# Patient Record
Sex: FEMALE | Race: WHITE | NOT HISPANIC OR LATINO | Employment: OTHER | URBAN - METROPOLITAN AREA
[De-identification: names, ages, dates, MRNs, and addresses within clinical notes are randomized per-mention and may not be internally consistent; named-entity substitution may affect disease eponyms.]

---

## 2022-08-09 ENCOUNTER — OFFICE VISIT (OUTPATIENT)
Dept: GASTROENTEROLOGY | Facility: CLINIC | Age: 87
End: 2022-08-09

## 2022-08-09 VITALS
HEIGHT: 64 IN | HEART RATE: 75 BPM | OXYGEN SATURATION: 97 % | DIASTOLIC BLOOD PRESSURE: 64 MMHG | WEIGHT: 172.6 LBS | SYSTOLIC BLOOD PRESSURE: 132 MMHG | TEMPERATURE: 97.9 F | BODY MASS INDEX: 29.47 KG/M2

## 2022-08-09 DIAGNOSIS — D12.6 TUBULOVILLOUS ADENOMA OF COLON: Primary | ICD-10-CM

## 2022-08-09 PROCEDURE — 99203 OFFICE O/P NEW LOW 30 MIN: CPT | Performed by: PHYSICIAN ASSISTANT

## 2022-08-09 RX ORDER — LISINOPRIL 20 MG/1
TABLET ORAL
COMMUNITY

## 2022-08-09 RX ORDER — FUROSEMIDE 20 MG/1
TABLET ORAL
COMMUNITY

## 2022-08-09 RX ORDER — ESOMEPRAZOLE MAGNESIUM 40 MG/1
CAPSULE, DELAYED RELEASE ORAL
COMMUNITY

## 2022-08-09 RX ORDER — OMEPRAZOLE 20 MG/1
20 CAPSULE, DELAYED RELEASE ORAL DAILY
COMMUNITY

## 2022-08-09 RX ORDER — ATORVASTATIN CALCIUM 10 MG/1
TABLET, FILM COATED ORAL
COMMUNITY

## 2022-08-09 RX ORDER — AMIODARONE HYDROCHLORIDE 200 MG/1
TABLET ORAL
COMMUNITY

## 2022-08-09 RX ORDER — MONTELUKAST SODIUM 10 MG/1
TABLET ORAL
COMMUNITY

## 2022-08-09 RX ORDER — POTASSIUM CHLORIDE 1.5 G/1.77G
POWDER, FOR SOLUTION ORAL
COMMUNITY

## 2022-08-09 NOTE — PROGRESS NOTES
New Patient Consultation     Patient Name: Yolanda Deleon  : 1934   MRN: [id number]     Chief Complaint:  No chief complaint on file.      History of Present Illness: Yolanda Deleon is a 87 y.o. female, PMH includes HTN, T2DM, HL, GERD, who is here today for a Gastroenterology Consultation for evaluation of sigmoid mass.     CSY 2022 with Dr. Christian. Inadequate bowel prep conditions. Small polyps in transverse colon, at level of hepatic flexure, all removed with snare. Mass noted in sigmoid colon, not amenable to resection but snare was used to obtain biopsies. Bx consistent with tubulovillous adenoma, negative for high grade dysplasia.     Pt was referred for second opinion and possible CSY with EMR. Pt was initially experiencing rectal bleeding, but is now back to her baseline of formed, medium brown stool output every 2-3 days.     Patient denies associated fever, chills, abdominal pain, indigestion, nausea, vomiting, diarrhea, constipation, hematemesis, dysphagia, hematochezia, melena, bloating, weight loss or gain, dysuria, jaundice or bruising.    Patient denies personal or FHx of PUD, H Pylori, gastritis, pancreatitis, colitis, Celiac disease, UC, Crohn's disease, IBS, colon or gastric cancers. Pt denies EtOH, tobacco, illicit substance or NSAID use.    Subjective      Review of Systems:   Review of Systems   Constitutional: Negative for appetite change, chills, diaphoresis, fatigue, fever, unexpected weight gain and unexpected weight loss.   HENT: Negative for drooling, facial swelling, mouth sores, rhinorrhea, sore throat, tinnitus, trouble swallowing and voice change.    Eyes: Negative.    Respiratory: Negative for cough, chest tightness and shortness of breath.    Cardiovascular: Negative for chest pain.   Gastrointestinal: Positive for anal bleeding (resolved). Negative for abdominal pain, blood in stool, constipation, diarrhea, nausea, vomiting, GERD and indigestion.   Genitourinary:  Negative for dysuria, flank pain, hematuria and pelvic pain.   Musculoskeletal: Negative for arthralgias and myalgias.   Skin: Negative for color change, pallor and rash.   Neurological: Negative for dizziness, tremors, syncope, weakness and numbness.   Psychiatric/Behavioral: Negative for hallucinations and sleep disturbance. The patient is not nervous/anxious.    All other systems reviewed and are negative.      Past Medical History: No past medical history on file.    Past Surgical History: No past surgical history on file.    Family History: No family history on file.    Social History:   Social History     Socioeconomic History   • Marital status:        Alcohol/Tobacco History:   Social History     Substance and Sexual Activity   Alcohol Use Not on file     Social History     Tobacco Use   Smoking Status Not on file   Smokeless Tobacco Not on file       Medications:     Current Outpatient Medications:   •  amiodarone (PACERONE) 200 MG tablet, amiodarone 200 mg tablet  Take 2 tablets every day by oral route., Disp: , Rfl:   •  atorvastatin (Lipitor) 10 MG tablet, Lipitor 10 mg tablet  Take 1 tablet every day by oral route., Disp: , Rfl:   •  Biotin 2.5 MG capsule, biotin 2,500 mcg capsule  Take 2 capsules every day by oral route for 90 days., Disp: , Rfl:   •  esomeprazole (nexIUM) 40 MG capsule, Nexium 40 mg capsule,delayed release  Daily, Disp: , Rfl:   •  furosemide (LASIX) 20 MG tablet, furosemide 20 mg tablet  Two times a day, Disp: , Rfl:   •  Glimepiride (AMARYL PO), glimepiride, Disp: , Rfl:   •  linagliptin (Tradjenta) 5 MG tablet tablet, Tradjenta 5 mg tablet  Take 1 tablet every day by oral route., Disp: , Rfl:   •  lisinopril (PRINIVIL,ZESTRIL) 20 MG tablet, lisinopril 20 mg tablet  Bedtime, Disp: , Rfl:   •  metFORMIN (Glucophage) 500 MG tablet, Glucophage 500 mg tablet  Two times a day, Disp: , Rfl:   •  montelukast (Singulair) 10 MG tablet, Singulair 10 mg tablet  Take 1 tablet every day  by oral route., Disp: , Rfl:   •  potassium chloride (Klor-Con) 20 MEQ packet, Klor-Con 20 mEq oral packet  Daily, Disp: , Rfl:   •  rivaroxaban (Xarelto) 20 MG tablet, Xarelto 20 mg tablet  Take 1 tablet every day by oral route., Disp: , Rfl:     Allergies:   Allergies   Allergen Reactions   • Aspirin Swelling       Objective     Physical Exam:  Vital Signs: There were no vitals filed for this visit.  There is no height or weight on file to calculate BMI.     Physical Exam  Vitals and nursing note reviewed.   Constitutional:       Appearance: Normal appearance. She is obese. She is not ill-appearing or diaphoretic.   HENT:      Head: Normocephalic and atraumatic.      Right Ear: External ear normal.      Left Ear: External ear normal.      Nose: Nose normal. No rhinorrhea.      Mouth/Throat:      Mouth: Mucous membranes are moist.      Pharynx: Oropharynx is clear. No posterior oropharyngeal erythema.   Eyes:      General:         Right eye: No discharge.         Left eye: No discharge.      Conjunctiva/sclera: Conjunctivae normal.      Pupils: Pupils are equal, round, and reactive to light.   Neck:      Thyroid: No thyromegaly.   Cardiovascular:      Rate and Rhythm: Normal rate and regular rhythm.      Pulses: Normal pulses.      Heart sounds: Normal heart sounds. No murmur heard.  Pulmonary:      Effort: Pulmonary effort is normal.      Breath sounds: Normal breath sounds. No wheezing.   Abdominal:      General: Abdomen is flat. Bowel sounds are normal. There is no distension.      Tenderness: There is no abdominal tenderness.   Musculoskeletal:         General: No tenderness. Normal range of motion.      Cervical back: Normal range of motion and neck supple.      Right lower leg: No edema.      Left lower leg: No edema.   Skin:     General: Skin is warm and dry.      Capillary Refill: Capillary refill takes less than 2 seconds.      Coloration: Skin is not jaundiced.      Findings: No bruising.   Neurological:       General: No focal deficit present.      Mental Status: She is oriented to person, place, and time.      Cranial Nerves: No cranial nerve deficit.   Psychiatric:         Mood and Affect: Mood normal.         Thought Content: Thought content normal.         Judgment: Judgment normal.         Assessment / Plan      Assessment/Plan:   There are no diagnoses linked to this encounter.     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 Saul, who states that pt would best benefit from colorectal evaluation for surgical resection of the mass rather than repeat CSY with EMR   - I spoke with  at [facility] who was able to make patient an appt with Dr. Contreras at their office at 1:30pm today   - pt was afforded directions to [facility], paper copy of her records prior to leaving our office today    Follow Up:   Return if symptoms worsen or fail to improve.    Plan of care reviewed with the patient at the conclusion of today's visit.  Education was provided regarding diagnosis, management, and any prescribed or recommended OTC medications.  Patient verbalized understanding of and agreement with management plan.     Time Statement:   Discussed plan of care in detail with patient today. Patient verbally understands and agrees. I have spent 30 minutes reviewing available diagnostics, obtaining history, examining the patient, developing a treatment plan, and educating the patient on disease process and plan of care.    Lashay Mora PA-C   [facility]

## 2025-02-12 ENCOUNTER — TELEPHONE (OUTPATIENT)
Age: OVER 89
End: 2025-02-12
Payer: MEDICARE

## 2025-02-12 NOTE — TELEPHONE ENCOUNTER
PATIENTS DAUGHTER CALLED IN TO CHECK ON THE STATUS OF A REFERRAL. THIS REFERRAL WAS MADE AT A JANUARY APPOINTMENT WITH OUR OLD PRACTICE. SINCE IT IS A PAPER CHART, WE DO NOT HAVE ACCESS TO THAT AND CANNOT CHECK WHAT THE REFERRAL IS FOR OR WHO IT IS TO. I ADVISED I WOULD ASK OUR PRACTICE MANAGER TO GET THE CHART AND PROVIDE IT TO OUR CLINICAL TEAM SO THE REFERRAL CAN BE ENTERED IN EPIC AND THEN SENT.

## 2025-03-04 ENCOUNTER — TELEPHONE (OUTPATIENT)
Age: OVER 89
End: 2025-03-04

## 2025-03-04 NOTE — TELEPHONE ENCOUNTER
Checked to see if chart was uploaded. It is still not uploaded, will give the name to Ayaka again to see they can rush to get it into the system.

## 2025-03-06 NOTE — TELEPHONE ENCOUNTER
Chart still not uploaded into the info resources website. Will try to look again later this evening.

## 2025-03-13 PROBLEM — I48.0 PAROXYSMAL ATRIAL FIBRILLATION: Status: ACTIVE | Noted: 2025-03-13

## 2025-03-13 PROBLEM — E78.5 HYPERLIPIDEMIA LDL GOAL <55: Status: ACTIVE | Noted: 2025-03-13

## 2025-03-13 PROBLEM — I25.10 CORONARY ARTERY DISEASE INVOLVING NATIVE CORONARY ARTERY OF NATIVE HEART: Status: ACTIVE | Noted: 2025-03-13

## 2025-03-13 PROBLEM — I10 HYPERTENSION, ESSENTIAL: Status: ACTIVE | Noted: 2025-03-13

## 2025-03-13 PROBLEM — N18.31 STAGE 3A CHRONIC KIDNEY DISEASE: Status: ACTIVE | Noted: 2025-03-13

## 2025-03-13 PROBLEM — I87.2 CHRONIC VENOUS INSUFFICIENCY OF LOWER EXTREMITY: Status: ACTIVE | Noted: 2025-03-13

## 2025-03-13 NOTE — PROGRESS NOTES
Cardiology Established Patient Note     Name: Yolanda Deleon  :   1934  PCP: Kanu Galeano PA-C  Date:   2025  Department: [facility] CARDIOLOGY  [address]  [facility] [address]  Fax [phone]  Phone [phone]    Chief Complaint:Here for my heart.    Subjective     History of Present Illness  Yolanda Deleon is a 89 y.o. female who presents today for 2-month follow-up.  Past cardiac history CAD, paroxysmal A-fib, hypertension, hyperlipidemia,  & chronic venous insufficiency.  She recently had worsening anasarca and had a venous duplex study in [place].  We do not have the report available to us however she was told that she has a clot in her left lower extremity where she had venous ablation.  The family was not told that she has a DVT in the right lower extremity where she did have 1 in .  And started on blood thinners.  She then had significant GI bleed and blood thinners were stopped.  She now feels better.  However she is still complaining of bilateral lower extremity edema worse on the left.    The following data was reviewed by: Damien Vieira MD on 2025:    Cath 2015: EF 55%, stent LAD  Renal ultrasound 2024: No stenosis  Carotid 2024: Less than 50% stenosis  Venous 10/7/2022: Acute on chronic thrombus noted in the right distal right femoral vein consistent with DVT., chronic SVT noted in left SSV, deep system reflux bilaterally  Echo 2022: EF 60 to 65%, mild LVH, trace AI, trace MR, mild TR  MCT 24: No arrhythmia, 7.48% SVE burden      Current Outpatient Medications:   •  amiodarone (PACERONE) 200 MG tablet, amiodarone 200 mg tablet  Take 2 tablets every day by oral route. (Patient taking differently: Take 0.5 tablets by mouth Daily.), Disp: , Rfl:   •  atorvastatin (LIPITOR) 10 MG tablet, Lipitor 10 mg tablet  Take 1 tablet every day by oral route. (Patient taking differently: Take 1  "tablet by mouth Every Night.), Disp: , Rfl:   •  Biotin 2.5 MG capsule, biotin 2,500 mcg capsule  Take 2 capsules every day by oral route for 90 days., Disp: , Rfl:   •  furosemide (LASIX) 20 MG tablet, furosemide 20 mg tablet  Two times a day, Disp: , Rfl:   •  montelukast (SINGULAIR) 10 MG tablet, Singulair 10 mg tablet  Take 1 tablet every day by oral route., Disp: , Rfl:   •  omeprazole (priLOSEC) 20 MG capsule, Take 1 capsule by mouth Daily., Disp: , Rfl:        Objective     Vital Signs:  /63 (BP Location: Left arm, Patient Position: Sitting, Cuff Size: Adult)   Pulse 96   Ht 160 cm (63\")   Wt 76.7 kg (169 lb)   BMI 29.94 kg/m²   Estimated body mass index is 29.94 kg/m² as calculated from the following:    Height as of this encounter: 160 cm (63\").    Weight as of this encounter: 76.7 kg (169 lb).         Cardiovascular:      PMI at left midclavicular line. Normal rate. Regular rhythm. Normal S1. Normal S2.       Murmurs: There is a grade 2/6 systolic murmur.      No gallop.  No click. No rub.   Pulses:     Intact distal pulses.   Edema:     Peripheral edema present.     Ankle: 2+ edema of the left ankle and 3+ edema of the right ankle.     Feet: 2+ edema of the left foot and 3+ edema of the right foot.      ECG 12 Lead    Date/Time: 3/14/2025 11:13 AM  Performed by: Damien Vieira MD    Authorized by: Damien Vieira MD  Rhythm: sinus rhythm  Ectopy: atrial premature contractions  Rate: normal  Other findings: non-specific ST-T wave changes  Comments: Normal sinus rhythm rhythm with PACs.         Assessment and Plan     Assessment & Plan  Coronary artery disease involving native coronary artery of native heart, unspecified whether angina present  Worsening SOB         Paroxysmal atrial fibrillation  Stable  EKG  Continue Xarelto and Amiodarone.       Hyperlipidemia LDL goal <55   Needs lopid panel         Hypertension, essential  BP log         Stage 3a chronic kidney disease  Needs labs     "     Chronic venous insufficiency of lower extremity  Worsening left lower extremity edema worse on the left compared to the right.  Reportedly recent DVT left lower extremity report from [place] not available.  we will reimage both lower extremities today.       SOB (shortness of breath)           Follow Up  No follow-ups on file.    Damien Vieira MD    [facility]

## 2025-03-14 ENCOUNTER — HOSPITAL ENCOUNTER (OUTPATIENT)
Facility: HOSPITAL | Age: OVER 89
Discharge: HOME | End: 2025-03-14
Payer: MEDICARE

## 2025-03-14 ENCOUNTER — LAB (OUTPATIENT)
Facility: HOSPITAL | Age: OVER 89
End: 2025-03-14
Payer: MEDICARE

## 2025-03-14 ENCOUNTER — OFFICE VISIT (OUTPATIENT)
Age: OVER 89
End: 2025-03-14
Payer: MEDICARE

## 2025-03-14 VITALS
WEIGHT: 169 LBS | DIASTOLIC BLOOD PRESSURE: 63 MMHG | BODY MASS INDEX: 29.95 KG/M2 | SYSTOLIC BLOOD PRESSURE: 120 MMHG | HEART RATE: 96 BPM | HEIGHT: 63 IN

## 2025-03-14 DIAGNOSIS — I87.2 CHRONIC VENOUS INSUFFICIENCY OF LOWER EXTREMITY: ICD-10-CM

## 2025-03-14 DIAGNOSIS — I13.0 HYPERTENSIVE HEART AND CHRONIC KIDNEY DISEASE WITH HEART FAILURE AND STAGE 1 THROUGH STAGE 4 CHRONIC KIDNEY DISEASE, OR UNSPECIFIED CHRONIC KIDNEY DISEASE: ICD-10-CM

## 2025-03-14 DIAGNOSIS — E78.5 HYPERLIPIDEMIA LDL GOAL <55: ICD-10-CM

## 2025-03-14 DIAGNOSIS — I25.10 CORONARY ARTERY DISEASE INVOLVING NATIVE CORONARY ARTERY OF NATIVE HEART, UNSPECIFIED WHETHER ANGINA PRESENT: Primary | ICD-10-CM

## 2025-03-14 DIAGNOSIS — N18.31 STAGE 3A CHRONIC KIDNEY DISEASE: ICD-10-CM

## 2025-03-14 DIAGNOSIS — I48.0 PAROXYSMAL ATRIAL FIBRILLATION: ICD-10-CM

## 2025-03-14 DIAGNOSIS — I10 HYPERTENSION, ESSENTIAL: ICD-10-CM

## 2025-03-14 DIAGNOSIS — R06.02 SOB (SHORTNESS OF BREATH): ICD-10-CM

## 2025-03-14 LAB
ANION GAP SERPL CALCULATED.3IONS-SCNC: 11.8 MMOL/L (ref 5–15)
BH CV LOW VAS LEFT GREATER SAPH AK VESSEL: 1
BH CV LOW VAS LEFT GREATER SAPH BK VESSEL: 1
BH CV LOW VAS LEFT LESSER SAPH VESSEL: 1
BH CV LOW VAS LEFT POPLITEAL SPONT: 1
BH CV LOW VAS RIGHT GREATER SAPH AK VESSEL: 1
BH CV LOW VAS RIGHT GREATER SAPH BK VESSEL: 1
BH CV LOW VAS RIGHT LESSER SAPH VESSEL: 1
BH CV LOW VAS RIGHT POPLITEAL SPONT: 1
BH CV LOW VAS RIGHT PROXIMAL FEMORAL SPONT: 1
BH CV LOWER VASCULAR LEFT COMMON FEMORAL AUGMENT: NORMAL
BH CV LOWER VASCULAR LEFT COMMON FEMORAL COMPETENT: NORMAL
BH CV LOWER VASCULAR LEFT COMMON FEMORAL COMPRESS: NORMAL
BH CV LOWER VASCULAR LEFT COMMON FEMORAL PHASIC: NORMAL
BH CV LOWER VASCULAR LEFT COMMON FEMORAL SPONT: NORMAL
BH CV LOWER VASCULAR LEFT DISTAL FEMORAL COMPRESS: NORMAL
BH CV LOWER VASCULAR LEFT GASTRONEMIUS COMPRESS: NORMAL
BH CV LOWER VASCULAR LEFT GREATER SAPH AK COMPRESS: NORMAL
BH CV LOWER VASCULAR LEFT GREATER SAPH BK COMPRESS: NORMAL
BH CV LOWER VASCULAR LEFT LESSER SAPH COMPRESS: NORMAL
BH CV LOWER VASCULAR LEFT MID FEMORAL AUGMENT: NORMAL
BH CV LOWER VASCULAR LEFT MID FEMORAL COMPETENT: NORMAL
BH CV LOWER VASCULAR LEFT MID FEMORAL COMPRESS: NORMAL
BH CV LOWER VASCULAR LEFT MID FEMORAL PHASIC: NORMAL
BH CV LOWER VASCULAR LEFT MID FEMORAL SPONT: NORMAL
BH CV LOWER VASCULAR LEFT PERONEAL COMPRESS: NORMAL
BH CV LOWER VASCULAR LEFT POPLITEAL AUGMENT: NORMAL
BH CV LOWER VASCULAR LEFT POPLITEAL COMPETENT: NORMAL
BH CV LOWER VASCULAR LEFT POPLITEAL COMPRESS: NORMAL
BH CV LOWER VASCULAR LEFT POPLITEAL PHASIC: NORMAL
BH CV LOWER VASCULAR LEFT POPLITEAL SPONT: NORMAL
BH CV LOWER VASCULAR LEFT POPLITEAL THROMBUS: NORMAL
BH CV LOWER VASCULAR LEFT POSTERIOR TIBIAL COMPRESS: NORMAL
BH CV LOWER VASCULAR LEFT PROFUNDA FEMORAL COMPRESS: NORMAL
BH CV LOWER VASCULAR LEFT PROXIMAL FEMORAL COMPRESS: NORMAL
BH CV LOWER VASCULAR LEFT SAPHENOFEMORAL JUNCTION COMPRESS: NORMAL
BH CV LOWER VASCULAR RIGHT COMMON FEMORAL AUGMENT: NORMAL
BH CV LOWER VASCULAR RIGHT COMMON FEMORAL COMPETENT: NORMAL
BH CV LOWER VASCULAR RIGHT COMMON FEMORAL COMPRESS: NORMAL
BH CV LOWER VASCULAR RIGHT COMMON FEMORAL PHASIC: NORMAL
BH CV LOWER VASCULAR RIGHT COMMON FEMORAL SPONT: NORMAL
BH CV LOWER VASCULAR RIGHT GREATER SAPH AK COMPRESS: NORMAL
BH CV LOWER VASCULAR RIGHT GREATER SAPH BK COMPRESS: NORMAL
BH CV LOWER VASCULAR RIGHT LESSER SAPH COMPRESS: NORMAL
BH CV LOWER VASCULAR RIGHT LESSER SAPH THROMBUS: NORMAL
BH CV LOWER VASCULAR RIGHT MID FEMORAL AUGMENT: NORMAL
BH CV LOWER VASCULAR RIGHT MID FEMORAL COMPETENT: NORMAL
BH CV LOWER VASCULAR RIGHT MID FEMORAL COMPRESS: NORMAL
BH CV LOWER VASCULAR RIGHT MID FEMORAL PHASIC: NORMAL
BH CV LOWER VASCULAR RIGHT MID FEMORAL SPONT: NORMAL
BH CV LOWER VASCULAR RIGHT PERONEAL COMPRESS: NORMAL
BH CV LOWER VASCULAR RIGHT POPLITEAL AUGMENT: NORMAL
BH CV LOWER VASCULAR RIGHT POPLITEAL COMPETENT: NORMAL
BH CV LOWER VASCULAR RIGHT POPLITEAL COMPRESS: NORMAL
BH CV LOWER VASCULAR RIGHT POPLITEAL PHASIC: NORMAL
BH CV LOWER VASCULAR RIGHT POPLITEAL SPONT: NORMAL
BH CV LOWER VASCULAR RIGHT POSTERIOR TIBIAL COMPRESS: NORMAL
BH CV LOWER VASCULAR RIGHT PROFUNDA FEMORAL COMPRESS: NORMAL
BH CV LOWER VASCULAR RIGHT PROXIMAL FEMORAL AUGMENT: NORMAL
BH CV LOWER VASCULAR RIGHT PROXIMAL FEMORAL COMPETENT: NORMAL
BH CV LOWER VASCULAR RIGHT PROXIMAL FEMORAL COMPRESS: NORMAL
BH CV LOWER VASCULAR RIGHT PROXIMAL FEMORAL PHASIC: NORMAL
BH CV LOWER VASCULAR RIGHT PROXIMAL FEMORAL SPONT: NORMAL
BH CV LOWER VASCULAR RIGHT SAPHENOFEMORAL JUNCTION COMPRESS: NORMAL
BUN SERPL-MCNC: 17 MG/DL (ref 8–23)
BUN/CREAT SERPL: 25.4 (ref 7–25)
CALCIUM SPEC-SCNC: 7.7 MG/DL (ref 8.2–9.6)
CHLORIDE SERPL-SCNC: 107 MMOL/L (ref 98–107)
CHOLEST SERPL-MCNC: 106 MG/DL (ref 0–200)
CO2 SERPL-SCNC: 23.2 MMOL/L (ref 22–29)
CREAT SERPL-MCNC: 0.67 MG/DL (ref 0.57–1)
EGFRCR SERPLBLD CKD-EPI 2021: 83.1 ML/MIN/1.73
GLUCOSE SERPL-MCNC: 171 MG/DL (ref 65–99)
HDLC SERPL-MCNC: 60 MG/DL (ref 40–60)
LDLC SERPL CALC-MCNC: 29 MG/DL (ref 0–100)
LDLC/HDLC SERPL: 0.49 {RATIO}
NT-PROBNP SERPL-MCNC: 729 PG/ML (ref 0–1800)
POTASSIUM SERPL-SCNC: 3 MMOL/L (ref 3.5–5.2)
SODIUM SERPL-SCNC: 142 MMOL/L (ref 136–145)
TRIGL SERPL-MCNC: 84 MG/DL (ref 0–150)
VLDLC SERPL-MCNC: 17 MG/DL (ref 5–40)

## 2025-03-14 PROCEDURE — 82570 ASSAY OF URINE CREATININE: CPT

## 2025-03-14 PROCEDURE — 36415 COLL VENOUS BLD VENIPUNCTURE: CPT

## 2025-03-14 PROCEDURE — 82043 UR ALBUMIN QUANTITATIVE: CPT

## 2025-03-14 PROCEDURE — 93970 EXTREMITY STUDY: CPT

## 2025-03-14 PROCEDURE — 86141 C-REACTIVE PROTEIN HS: CPT

## 2025-03-14 PROCEDURE — 80076 HEPATIC FUNCTION PANEL: CPT

## 2025-03-14 PROCEDURE — 83695 ASSAY OF LIPOPROTEIN(A): CPT

## 2025-03-14 PROCEDURE — 80048 BASIC METABOLIC PNL TOTAL CA: CPT

## 2025-03-14 PROCEDURE — 83880 ASSAY OF NATRIURETIC PEPTIDE: CPT

## 2025-03-14 PROCEDURE — 80061 LIPID PANEL: CPT

## 2025-03-14 NOTE — ASSESSMENT & PLAN NOTE
Worsening left lower extremity edema worse on the left compared to the right.  Reportedly recent DVT left lower extremity report from [place] not available.  we will reimage both lower extremities today.

## 2025-03-15 LAB
ALBUMIN SERPL-MCNC: 2.7 G/DL (ref 3.5–5.2)
ALBUMIN UR-MCNC: <1.2 MG/DL
ALP SERPL-CCNC: 79 U/L (ref 39–117)
ALT SERPL W P-5'-P-CCNC: 6 U/L (ref 1–33)
AST SERPL-CCNC: 14 U/L (ref 1–32)
BILIRUB CONJ SERPL-MCNC: <0.1 MG/DL (ref 0–0.3)
BILIRUB INDIRECT SERPL-MCNC: ABNORMAL MG/DL
BILIRUB SERPL-MCNC: <0.2 MG/DL (ref 0–1.2)
CREAT UR-MCNC: 136.1 MG/DL
CRP SERPL-MCNC: 1.69 MG/DL (ref 0.01–0.5)
MICROALBUMIN/CREAT UR: NORMAL MG/G{CREAT}
PROT SERPL-MCNC: 5.9 G/DL (ref 6–8.5)

## 2025-03-17 ENCOUNTER — TELEPHONE (OUTPATIENT)
Age: OVER 89
End: 2025-03-17
Payer: MEDICARE

## 2025-03-17 LAB — LPA SERPL-SCNC: 86 NMOL/L

## 2025-03-17 NOTE — TELEPHONE ENCOUNTER
Attempted call to patient to start on blood thinner, no answer, left voicemail to return call to office.

## 2025-03-18 ENCOUNTER — TELEPHONE (OUTPATIENT)
Age: OVER 89
End: 2025-03-18
Payer: MEDICARE

## 2025-03-18 NOTE — TELEPHONE ENCOUNTER
Zachary, patient's brother called Dr. Vieira late last night with concerns of new medications. Dr. Vieira requested I call him back to see what exactly patient was needing in regard to blood thinner. I attempted to call Zachary ([phone]) NA/NVM.

## 2025-03-19 ENCOUNTER — TELEPHONE (OUTPATIENT)
Age: OVER 89
End: 2025-03-19
Payer: MEDICARE

## 2025-03-19 DIAGNOSIS — I82.4Y2 ACUTE DEEP VEIN THROMBOSIS (DVT) OF PROXIMAL VEIN OF LEFT LOWER EXTREMITY: Primary | ICD-10-CM

## 2025-03-19 NOTE — TELEPHONE ENCOUNTER
Patient's daughter called this morning after Dr. Vieira called her brother, the patient's son, and reports that Dr. Vieira would like for her to start Eliquis 5 mg p.o. twice daily but that the patient did not have a prescription for Eliquis.  Patient was recently diagnosed with acute DVT, patient does have a history of GI bleeding on Xarelto.  Discussed with patient and family members the risk of bleeding,   Are agreeable to try Eliquis and monitor for signs of severe bleeding.  Patient has concern of cancer recurrence.  Patient also has atrial fibrillation, will need to discuss with patient option for Watchman/IVC filter if oral anticoagulation fails again.  Eliquis 5 mg p.o. twice daily was sent into the pharmacy.  Opted not to do treatment dose of Eliquis x 1 week secondary to history of GI bleeding.  Patient does not meet criteria for lower dose of Eliquis other than just her age.  Discussed with Dr. Vieira.

## 2025-03-19 NOTE — TELEPHONE ENCOUNTER
Noemí talked to family today in regards to this medication (eliquis) and it was sent into pharmacy of patient request.

## 2025-04-03 ENCOUNTER — TELEPHONE (OUTPATIENT)
Age: OVER 89
End: 2025-04-03
Payer: MEDICARE

## 2025-04-03 NOTE — TELEPHONE ENCOUNTER
The patient's daughter called and said Dr. Vieira wanted to watch her hemoglobin since starting her back on blood thinner. It is 10.4 down from 11.4. Please advise.    Unable to obtain from patient   HCP- Berto Mitchell  Has A

## 2025-04-03 NOTE — TELEPHONE ENCOUNTER
Spoke with patient's granddaughter. She states the patient doesn't have any signs of bleeding, to her knowledge. Advised them to call back if anything changes and we will order the recheck labs at next appt, on 04/11/2025, granddaughter understood.

## 2025-04-10 NOTE — PROGRESS NOTES
Cardiology Established Patient Note     Name: Yolanda Deleon  :   1934  PCP: Kanu Galeano PA-C  Date:   2025  Department: [facility] [place] [facility] CARDIOLOGY  [address]  [facility] [address]  Fax [phone]  Phone [phone]    Chief Complaint:   Here for my blood clot.  Subjective     History of Present Illness  Yolanda Deleon is a 89 y.o. female who presents today for 4-week follow-up.  Recently had pain and swelling in lower q3ajwheltarg. Venous duplex study cw L popliteal vein thrombosis.  Started on DOAC.H/H dropping from 11. 3 to 10 mg/dL25.   She is asymptomtic  CKD 3 without sig proteinuria.  Problem list:  CAD  Cath 2015: EF 55%, stent LAD   Paroxysmal A-fib  Echo 2022: EF 60 to 65%, mild LVH, trace AI, trace MR, mild TR   MCT 24: No arrhythmia, 7.48% SVE burden   Hypertension  Renal ultrasound 2024: No stenosis   Hyperlipidemia  Carotid 2024: Less than 50% stenosis   Chronic venous insufficiency  Venous 10/7/2022: Acute on chronic thrombus noted in the right distal right femoral vein consistent with DVT., chronic SVT noted in left SSV, deep system reflux bilaterally   Venous w Reflux Lower Extremity - Bilateral CAR (2025 11:15)   DVT  Duplex Venous Lower Extremity - Bilateral CAR (2025 15:17)   L Pop vein thrombosis  History GI bleed    Current Outpatient Medications   Medication Instructions    amiodarone (PACERONE) 200 MG tablet amiodarone 200 mg tablet   Take 2 tablets every day by oral route.    apixaban (ELIQUIS) 5 mg, Oral, 2 Times Daily    atorvastatin (LIPITOR) 10 MG tablet Lipitor 10 mg tablet   Take 1 tablet every day by oral route.    Biotin 2.5 MG capsule biotin 2,500 mcg capsule   Take 2 capsules every day by oral route for 90 days.    furosemide (LASIX) 20 MG tablet furosemide 20 mg tablet   Two times a day    montelukast (SINGULAIR) 10 MG tablet Singulair 10  "mg tablet   Take 1 tablet every day by oral route.    omeprazole (PRILOSEC) 20 mg, Daily        Lab Results   Component Value Date    GLUCOSE 171 (H) 03/14/2025    BUN 17 03/14/2025    CREATININE 0.67 03/14/2025    EGFRIFAFRI 48 02/17/2023    BCR 25.4 (H) 03/14/2025    K 3.0 (L) 03/14/2025    CO2 23.2 03/14/2025    CALCIUM 7.7 (L) 03/14/2025    ALBUMIN 2.7 (L) 03/14/2025    AST 14 03/14/2025    ALT 6 03/14/2025     Lab Results   Component Value Date    CHOL 106 03/14/2025    TRIG 84 03/14/2025    HDL 60 03/14/2025    LDL 29 03/14/2025      Lab Results   Component Value Date    WBC 15.9 (H) 04/09/2025    RBC 3.69 (L) 04/09/2025    HGB 10.0 (L) 04/09/2025    HCT 31.8 (L) 04/09/2025    MCV 86.1 04/09/2025     (H) 04/09/2025     No results found for: \"TSH\"  No results found for: \"HGBA1C\"  Microalbumin, Urine   Date Value Ref Range Status   03/14/2025 <1.2 mg/dL Final     Lipoprotein (a)   Date Value Ref Range Status   03/14/2025 86.0 (H) <75.0 nmol/L Final     Comment:     Note:  Values greater than or equal to 75.0 nmol/L may         indicate an independent risk factor for CHD,         but must be evaluated with caution when applied         to non- populations due to the         influence of genetic factors on Lp(a) across         ethnicities.      Microalbumin/Creatinine Ratio   Date Value Ref Range Status   03/14/2025   Final     Comment:     Unable to calculate       Objective     Vital Signs:  /64 (BP Location: Left arm, Patient Position: Sitting, Cuff Size: Adult)   Pulse 91   Ht 160 cm (63\")   Wt 71.2 kg (157 lb)   BMI 27.81 kg/m²   Estimated body mass index is 27.81 kg/m² as calculated from the following:    Height as of this encounter: 160 cm (63\").    Weight as of this encounter: 71.2 kg (157 lb).       Cardiovascular:      PMI at left midclavicular line. Normal rate. Regular rhythm. Normal S1. Normal S2.       Murmurs: There is no murmur.      No gallop.  No click. No rub. "   Pulses:     Intact distal pulses.   Edema:     Peripheral edema present.     Ankle: 2+ edema of the left ankle and 1+ edema of the right ankle.     Feet: 2+ edema of the left foot and 1+ edema of the right foot.            Assessment and Plan     Assessment & Plan  Acute deep vein thrombosis (DVT) of proximal vein of left lower extremity  Continue Eliquis at current dose       Hyperlipidemia LDL goal <100   Lipid abnormalities are stable    Plan:  Continue same medication/s without change.      Discussed medication dosage, use, side effects, and goals of treatment in detail.    Counseled patient on lifestyle modifications to help control hyperlipidemia.     Patient Treatment Goals:   LDL goal is less than 70    Followup in 6 months.    Orders:    Hepatic Function Panel; Future    High Sensitivity CRP; Future    Lipoprotein A (LPA); Future    Lipid Panel; Future    Hypertension, unspecified type  Hypertension is stable and controlled  Continue current treatment regimen.  Blood pressure will be reassessed in 6 months  .    Orders:    Comprehensive Metabolic Panel; Future    Microalbumin / Creatinine Urine Ratio - Urine, Clean Catch; Future    Elevated lipoprotein(a)   Lipid abnormalities are stable    Plan:  Continue same medication/s without change.      Discussed medication dosage, use, side effects, and goals of treatment in detail.    Counseled patient on lifestyle modifications to help control hyperlipidemia.     Patient Treatment Goals:   LDL goal is less than 70    Followup in 6 months with repeat LPa         Stage 3a chronic kidney disease  Consider Farxiga           Follow Up  No follow-ups on file.    [facility]

## 2025-04-11 ENCOUNTER — OFFICE VISIT (OUTPATIENT)
Age: OVER 89
End: 2025-04-11
Payer: MEDICARE

## 2025-04-11 VITALS
DIASTOLIC BLOOD PRESSURE: 64 MMHG | SYSTOLIC BLOOD PRESSURE: 125 MMHG | HEIGHT: 63 IN | HEART RATE: 91 BPM | BODY MASS INDEX: 27.82 KG/M2 | WEIGHT: 157 LBS

## 2025-04-11 DIAGNOSIS — E78.41 ELEVATED LIPOPROTEIN(A): ICD-10-CM

## 2025-04-11 DIAGNOSIS — I82.4Y2 ACUTE DEEP VEIN THROMBOSIS (DVT) OF PROXIMAL VEIN OF LEFT LOWER EXTREMITY: Primary | ICD-10-CM

## 2025-04-11 DIAGNOSIS — N18.31 STAGE 3A CHRONIC KIDNEY DISEASE: ICD-10-CM

## 2025-04-11 DIAGNOSIS — I10 HYPERTENSION, UNSPECIFIED TYPE: ICD-10-CM

## 2025-04-11 DIAGNOSIS — E78.5 HYPERLIPIDEMIA LDL GOAL <100: ICD-10-CM

## 2025-04-17 ENCOUNTER — TELEPHONE (OUTPATIENT)
Age: OVER 89
End: 2025-04-17
Payer: MEDICARE

## 2025-04-17 NOTE — TELEPHONE ENCOUNTER
[facility] [employer] [facility] [place] [facility]ill on Kerendia 10mg one tablet once a day. This medication was not listed per last office note. Per Fabio, this patient was on this medication. Please advise on if it needs to refilled or not.

## 2025-04-18 NOTE — TELEPHONE ENCOUNTER
Can we call patient and verify if she has been taking? Per Fabio, she was on 20 mg daily not 10 mg but he does not mention it in either visit on EPIC

## 2025-04-18 NOTE — TELEPHONE ENCOUNTER
Spoke with patient, she states that she hasn't been taking Kerendia because the last time she was in the hospital the hospitalitis told the patient to stop taking it. She does not need a refill, right now, because she has plenty of it.     Patient would like to know if she should start retaking it? I advised her to hold off on taking it until I get an answer back from one of the providers. Please advise.

## 2025-04-24 DIAGNOSIS — N18.9 CHRONIC KIDNEY DISEASE, UNSPECIFIED CKD STAGE: Primary | ICD-10-CM

## 2025-04-24 RX ORDER — FINERENONE 10 MG/1
10 TABLET, FILM COATED ORAL DAILY
Qty: 90 TABLET | Refills: 1 | Status: SHIPPED | OUTPATIENT
Start: 2025-04-24

## 2025-04-24 NOTE — TELEPHONE ENCOUNTER
Rx Refill Note  KERENDIA 10MG     Last office visit with prescribing clinician: 4/11/2025   Last telemedicine visit with prescribing clinician: Visit date not found   Next office visit with prescribing clinician: 5/9/2025                        Pharmacy Info    Last Fill Date:  Rx Written Date:   Prescribed Qty:   Additional Details from Pharmacy:    Patient passed protocols per madonna.         Melina Booth MA  04/24/25, 15:01 EDT